# Patient Record
Sex: MALE | Race: BLACK OR AFRICAN AMERICAN | NOT HISPANIC OR LATINO | Employment: FULL TIME | URBAN - METROPOLITAN AREA
[De-identification: names, ages, dates, MRNs, and addresses within clinical notes are randomized per-mention and may not be internally consistent; named-entity substitution may affect disease eponyms.]

---

## 2017-02-14 ENCOUNTER — HOSPITAL ENCOUNTER (OUTPATIENT)
Dept: RADIOLOGY | Facility: OTHER | Age: 37
Discharge: HOME | End: 2017-02-14
Attending: PHYSICAL MEDICINE & REHABILITATION
Payer: COMMERCIAL

## 2017-02-14 ENCOUNTER — OFFICE VISIT (OUTPATIENT)
Dept: SPINE | Facility: CLINIC | Age: 37
End: 2017-02-14
Attending: PHYSICAL MEDICINE & REHABILITATION
Payer: COMMERCIAL

## 2017-02-14 VITALS
BODY MASS INDEX: 37.13 KG/M2 | HEART RATE: 81 BPM | SYSTOLIC BLOOD PRESSURE: 161 MMHG | HEIGHT: 68 IN | WEIGHT: 245 LBS | DIASTOLIC BLOOD PRESSURE: 97 MMHG

## 2017-02-14 DIAGNOSIS — G57.01 PIRIFORMIS SYNDROME, RIGHT: ICD-10-CM

## 2017-02-14 DIAGNOSIS — M54.41 ACUTE RIGHT-SIDED LOW BACK PAIN WITH RIGHT-SIDED SCIATICA: Primary | ICD-10-CM

## 2017-02-14 DIAGNOSIS — M54.41 ACUTE RIGHT-SIDED LOW BACK PAIN WITH RIGHT-SIDED SCIATICA: ICD-10-CM

## 2017-02-14 PROCEDURE — 72114 X-RAY EXAM L-S SPINE BENDING: CPT | Mod: TC

## 2017-02-14 PROCEDURE — 99204 OFFICE O/P NEW MOD 45 MIN: CPT | Mod: S$GLB,,, | Performed by: PHYSICAL MEDICINE & REHABILITATION

## 2017-02-14 PROCEDURE — 99999 PR PBB SHADOW E&M-NEW PATIENT-LVL III: CPT | Mod: PBBFAC,,, | Performed by: PHYSICAL MEDICINE & REHABILITATION

## 2017-02-14 PROCEDURE — 72114 X-RAY EXAM L-S SPINE BENDING: CPT | Mod: 26,,, | Performed by: RADIOLOGY

## 2017-02-14 RX ORDER — METHYLPREDNISOLONE 4 MG/1
TABLET ORAL
Qty: 1 PACKAGE | Refills: 0 | Status: SHIPPED | OUTPATIENT
Start: 2017-02-14 | End: 2017-03-07

## 2017-02-14 RX ORDER — METHOCARBAMOL 500 MG/1
500 TABLET, FILM COATED ORAL 4 TIMES DAILY
Qty: 120 TABLET | Refills: 2 | Status: SHIPPED | OUTPATIENT
Start: 2017-02-14

## 2017-02-14 NOTE — MR AVS SNAPSHOT
[facility]  [address]  [address]  [facility] [address]  Phone: [phone]  Fax: [phone]                  Jose C Dasilva Jr.   2017 10:30 AM   Office Visit    Description:  Male : 1980   Provider:  Bee Licea MD   Department:  [facility]           Reason for Visit     Low-back Pain           Diagnoses this Visit        Comments    Acute right-sided low back pain with right-sided sciatica    -  Primary     Piriformis syndrome, right                To Do List           Future Appointments        Provider Department Dept Phone    3/28/2017 11:00 AM Bee Licea MD [facility] [phone]      Goals (5 Years of Data)     None      Follow-Up and Disposition     Return in about 6 weeks (around 3/28/2017).       These Medications        Disp Refills Start End    methylPREDNISolone (MEDROL DOSEPACK) 4 mg tablet 1 Package 0 2017 3/7/2017    use as directed    Pharmacy: [facility] - [place], [place] - [address] Ph #: [phone]       methocarbamol (ROBAXIN) 500 MG Tab 120 tablet 2 2017     Take 1 tablet (500 mg total) by mouth 4 (four) times daily. - Oral    Pharmacy: [facility] - [place], [place] - [address] Ph #: [phone]         [facility]     [facility] -  Assistance  Registered nurses in the [facility] provide clinical advisement, health education, appointment booking, and other advisory services.  Call for this free service at [phone].             Medications           Message regarding Medications     Verify the changes and/or additions to your medication regime listed below are the same as discussed with your clinician today.  If any of these changes or additions are incorrect, please notify your healthcare provider.        START taking these NEW medications        Refills     "methylPREDNISolone (MEDROL DOSEPACK) 4 mg tablet 0    Sig: use as directed    Class: Normal    methocarbamol (ROBAXIN) 500 MG Tab 2    Sig: Take 1 tablet (500 mg total) by mouth 4 (four) times daily.    Class: Normal    Route: Oral           Verify that the below list of medications is an accurate representation of the medications you are currently taking.  If none reported, the list may be blank. If incorrect, please contact your healthcare provider. Carry this list with you in case of emergency.           Current Medications     methocarbamol (ROBAXIN) 500 MG Tab Take 1 tablet (500 mg total) by mouth 4 (four) times daily.    methylPREDNISolone (MEDROL DOSEPACK) 4 mg tablet use as directed           Clinical Reference Information           Your Vitals Were     BP Pulse Height Weight BMI    161/97 (BP Location: Left arm, Patient Position: Sitting, BP Method: Automatic) 81 5' 8" (1.727 m) 111.1 kg (245 lb) 37.25 kg/m2      Blood Pressure          Most Recent Value    BP  (!)  161/97      Allergies as of 2/14/2017     No Known Allergies      Immunizations Administered on Date of Encounter - 2/14/2017     None      Orders Placed During Today's Visit      Normal Orders This Visit    Ambulatory Referral to Physical/Occupational Therapy     Future Labs/Procedures Expected by Expires    X-Ray Lumbar Complete With Flex And Ext  2/14/2017 2/14/2018      [facility] Sign-Up     Activating your [facility] account is as easy as 1-2-3!     1) Visit [email or web address], select Sign Up Now, enter this activation code and your date of birth, then select Next.  [email or web address]  Expires: 3/31/2017 10:30 AM      2) Create a username and password to use when you visit [facility] in the future and select a security question in case you lose your password and select Next.    3) Enter your e-mail address and click Sign Up!    Additional Information  If you have questions, please e-mail [email or web address] or call [phone] to talk to our " [facility] staff. Remember, [facility] is NOT to be used for urgent needs. For medical emergencies, dial 911.         Smoking Cessation     If you would like to quit smoking:   You may be eligible for free services if you are a [place] resident and started smoking cigarettes before September 1, 1988.  Call the [employer]oking [employer]essation [employer] (SCT) toll free at (175) 524-6029 or (550) 647-0039.   Call 1-800-QUIT-NOW if you do not meet the above criteria.            Language Assistance Services     ATTENTION: Language assistance services are available, free of charge. Please call [phone].      ATENCIÓN: Si habla español, tiene a hernandez disposición servicios gratuitos de asistencia lingüística. Llame al [phone].     CHÚ Ý: N?u b?n nói Ti?ng Vi?t, có các d?ch v? h? tr? ngôn ng? mi?n phí dành cho b?n. G?i s? [phone].         [facility] complies with applicable Federal civil rights laws and does not discriminate on the basis of race, color, national origin, age, disability, or sex.

## 2017-02-14 NOTE — PROGRESS NOTES
Subjective:      Patient ID: Jose C Dasilva Jr. is a 36 y.o. male.    Chief Complaint: Low-back Pain    HPI Comments: Mr Dasilva is a 35 yo male here for evaluation of low back pain.  He is new to the [facility].  He has had back pain on and off since and slip and fall at work 2013.  This visit is not workers comp.  The pain flared 4-5 days ago.  He was not having any pain until this flare started.  He has not been able to sleep the past 2 night.  He has pain shooting down the leg.  The back pain is the worst.  The pain is all on the right side and down the calf.  There was no incident that started the flare.  He was recovering from a cold.  The pain is constant.  The pain is the worst getting out of bed in the morning.  The pain is ok with standing, but worse with moving.  The back pain is a stiffness.  The leg pain is a sticking pain that comes and goes.  The leg pain is with lying down.  The pain goes down to the calf.  He has been taking tylenol twice a day, 500mg pills.  He has been to PT, he does his stretches and tries to exercise.  He has not had injections in his back, he thought about it.  The pain is 7/10 now, worst 10/10 at night and getting up in the morning, best 5/10 with walking and moving.  Lying down he feels best letting right leg hanging off the bed.      Past Medical History:    Arthritis                                                       Comment:back    Past Surgical History:    KNEE SURGERY                                                     Comment:2014    Review of patient's family history indicates:    Abnormal EKG                   Father                      Social History    Marital status: Single              Spouse name:                       Years of education:                 Number of children:               Social History Main Topics    Smoking status: Current Some Day Smoker                                                      Packs/day: 0.00      Years: 0.00            Types: Cigars    Alcohol use: Yes             Drug use: No              Sexual activity: Yes                      No current outpatient prescriptions on file.  No current facility-administered medications for this visit.       Review of patient's allergies indicates:  No Known Allergies            Review of Systems   Constitution: Negative for weight gain and weight loss.   Cardiovascular: Negative for chest pain.   Respiratory: Negative for shortness of breath.    Musculoskeletal: Positive for back pain. Negative for joint pain and joint swelling.   Gastrointestinal: Negative for abdominal pain and bowel incontinence.   Genitourinary: Negative for bladder incontinence.   Neurological: Negative for numbness.         Objective:        General: Jose C is well-developed, well-nourished, appears stated age, in no acute distress, alert and oriented to time, place and person.     General    Vitals reviewed.  Constitutional: He is oriented to person, place, and time. He appears well-developed and well-nourished.   HENT:   Head: Normocephalic and atraumatic.   Pulmonary/Chest: Effort normal.   Neurological: He is alert and oriented to person, place, and time.   Psychiatric: He has a normal mood and affect. His behavior is normal. Judgment and thought content normal.     General Musculoskeletal Exam   Gait: normal     Right Ankle/Foot Exam     Tests   Heel Walk: able to perform  Tiptoe Walk: able to perform    Left Ankle/Foot Exam     Tests   Heel Walk: able to perform  Tiptoe Walk: able to perform  Back (L-Spine & T-Spine) / Neck (C-Spine) Exam     Tenderness Right paramedian tenderness of the Sacrum.     Back (L-Spine & T-Spine) Range of Motion   Extension: 20   Flexion: 70   Lateral Bend Right: 20   Lateral Bend Left: 20   Rotation Right: 40   Rotation Left: 40     Spinal Sensation   Right Side Sensation  C-Spine Level: normal   L-Spine Level: normal  S-Spine Level: normal  Left Side Sensation  C-Spine Level:  normal  L-Spine Level: normal  S-Spine Level: normal    Back (L-Spine & T-Spine) Tests   Right Side Tests  Straight leg raise:      Sitting SLR: > 70 degrees      Left Side Tests  Straight leg raise:     Sitting SLR: > 70 degrees          Other He has no scoliosis .  Spinal Kyphosis:  Absent      Muscle Strength   Right Upper Extremity   Biceps: 5/5/5   Deltoid:  5/5  Triceps:  5/5  Wrist Extension: 5/5/5   Finger Flexors:  5/5  Left Upper Extremity  Biceps: 5/5/5   Deltoid:  5/5  Triceps:  5/5  Wrist Extension: 5/5/5   Finger Flexors:  5/5  Right Lower Extremity   Hip Flexion: 5/5   Quadriceps:  5/5   Anterior tibial:  5/5/5  EHL:  5/5  Left Lower Extremity   Hip Flexion: 5/5   Quadriceps:  5/5   Anterior tibial:  5/5/5   EHL:  5/5    Reflexes     Left Side  Biceps:  2+  Triceps:  2+  Brachioradialis:  2+  Quadriceps:  2+  Achilles:  2+  Left Watters's Sign:  Absent  Babinski Sign:  absent    Right Side   Biceps:  2+  Triceps:  2+  Brachioradialis:  2+  Quadriceps:  2+  Achilles:  2+  Right Watters's Sign:  absent  Babinski Sign:  absent    Vascular Exam     Right Pulses        Carotid:                  2+    Left Pulses        Carotid:                  2+              Assessment:       1. Acute right-sided low back pain with right-sided sciatica    2. Piriformis syndrome, right           Plan:       Orders Placed This Encounter    X-Ray Lumbar Complete With Flex And Ext    Ambulatory Referral to Physical/Occupational Therapy    methylPREDNISolone (MEDROL DOSEPACK) 4 mg tablet    methocarbamol (ROBAXIN) 500 MG Tab     More than 50% of the total time of 45 minutes was spent face to face in counseling on diagnosis and treatment options.  We discussed back pain and the nature of back pain.  We discussed that it will likely improve and that it is not one thing that causes the pain but an accumulation of multiple things that we do.  We discussed posture sitting and the importance of trying to sit better.  We  discussed the benefits of therapy and exercise and continuing to move.  He has been doing well.  We discussed going to therapy and working on strengthening, and continuing the exercises.  i encourage him to keep moving.  1.  Medrol dose gela  2.  Robaxin 500mg po QID  3.  PT for piriformis stretches Si joint mobilization and core strengthening at [facility]  4.  X-ray of the lumbar spine  5.  RTC 6 weeks        Follow-up: Return in about 6 weeks (around 3/28/2017). If there are any questions prior to this, the patient was instructed to contact the office.

## 2017-02-15 ENCOUNTER — TELEPHONE (OUTPATIENT)
Dept: SPINE | Facility: CLINIC | Age: 37
End: 2017-02-15

## 2017-02-17 ENCOUNTER — TELEPHONE (OUTPATIENT)
Dept: SPINE | Facility: CLINIC | Age: 37
End: 2017-02-17

## 2017-02-17 NOTE — TELEPHONE ENCOUNTER
Called patient no answer left message on voicemail to give office a call in regards to xray results.

## 2017-02-23 ENCOUNTER — TELEPHONE (OUTPATIENT)
Dept: SPINE | Facility: CLINIC | Age: 37
End: 2017-02-23

## 2017-02-23 NOTE — TELEPHONE ENCOUNTER
Called spoke with patient per  request in form him that his x-ray looks good he does have some mild arthritis but good alignment patient verbally understood.    ----- Message from Bee Licea MD sent at 2/14/2017  2:03 PM CST -----  Please let him know the x-ray looks good.  He does have some mild arthritis but good alignment